# Patient Record
Sex: MALE | Race: WHITE | NOT HISPANIC OR LATINO
[De-identification: names, ages, dates, MRNs, and addresses within clinical notes are randomized per-mention and may not be internally consistent; named-entity substitution may affect disease eponyms.]

---

## 2017-01-11 ENCOUNTER — APPOINTMENT (OUTPATIENT)
Dept: OTOLARYNGOLOGY | Facility: CLINIC | Age: 52
End: 2017-01-11

## 2017-01-11 VITALS
HEART RATE: 75 BPM | HEIGHT: 71 IN | TEMPERATURE: 98.4 F | DIASTOLIC BLOOD PRESSURE: 66 MMHG | OXYGEN SATURATION: 99 % | BODY MASS INDEX: 25.9 KG/M2 | SYSTOLIC BLOOD PRESSURE: 96 MMHG | WEIGHT: 185 LBS

## 2017-01-19 ENCOUNTER — APPOINTMENT (OUTPATIENT)
Dept: OTOLARYNGOLOGY | Facility: CLINIC | Age: 52
End: 2017-01-19

## 2017-01-19 VITALS
OXYGEN SATURATION: 98 % | TEMPERATURE: 97.9 F | DIASTOLIC BLOOD PRESSURE: 70 MMHG | HEART RATE: 65 BPM | SYSTOLIC BLOOD PRESSURE: 105 MMHG

## 2017-02-15 ENCOUNTER — APPOINTMENT (OUTPATIENT)
Dept: OTOLARYNGOLOGY | Facility: CLINIC | Age: 52
End: 2017-02-15

## 2017-02-15 VITALS
HEART RATE: 79 BPM | DIASTOLIC BLOOD PRESSURE: 61 MMHG | OXYGEN SATURATION: 95 % | SYSTOLIC BLOOD PRESSURE: 93 MMHG | TEMPERATURE: 97.7 F

## 2017-03-15 ENCOUNTER — APPOINTMENT (OUTPATIENT)
Dept: INTERNAL MEDICINE | Facility: CLINIC | Age: 52
End: 2017-03-15

## 2017-05-17 ENCOUNTER — APPOINTMENT (OUTPATIENT)
Dept: OTOLARYNGOLOGY | Facility: CLINIC | Age: 52
End: 2017-05-17

## 2017-05-17 VITALS
OXYGEN SATURATION: 100 % | HEART RATE: 77 BPM | TEMPERATURE: 98.6 F | SYSTOLIC BLOOD PRESSURE: 90 MMHG | DIASTOLIC BLOOD PRESSURE: 60 MMHG

## 2017-05-17 DIAGNOSIS — H81.01 MENIERE'S DISEASE, RIGHT EAR: ICD-10-CM

## 2017-05-31 ENCOUNTER — APPOINTMENT (OUTPATIENT)
Dept: INTERNAL MEDICINE | Facility: CLINIC | Age: 52
End: 2017-05-31

## 2017-05-31 VITALS
OXYGEN SATURATION: 98 % | HEART RATE: 88 BPM | SYSTOLIC BLOOD PRESSURE: 90 MMHG | BODY MASS INDEX: 25.06 KG/M2 | WEIGHT: 179 LBS | TEMPERATURE: 98.3 F | HEIGHT: 71 IN | DIASTOLIC BLOOD PRESSURE: 70 MMHG

## 2017-05-31 DIAGNOSIS — F15.90 OTHER STIMULANT USE, UNSPECIFIED, UNCOMPLICATED: ICD-10-CM

## 2017-05-31 DIAGNOSIS — H81.393 OTHER PERIPHERAL VERTIGO, BILATERAL: ICD-10-CM

## 2017-05-31 DIAGNOSIS — Z00.00 ENCOUNTER FOR GENERAL ADULT MEDICAL EXAMINATION W/OUT ABNORMAL FINDINGS: ICD-10-CM

## 2017-05-31 DIAGNOSIS — R42 DIZZINESS AND GIDDINESS: ICD-10-CM

## 2017-05-31 DIAGNOSIS — Z78.9 OTHER SPECIFIED HEALTH STATUS: ICD-10-CM

## 2017-05-31 RX ORDER — CETIRIZINE HCL 10 MG
TABLET ORAL
Refills: 0 | Status: ACTIVE | COMMUNITY

## 2017-06-01 LAB
25(OH)D3 SERPL-MCNC: 31.2 NG/ML
ALBUMIN SERPL ELPH-MCNC: 4.6 G/DL
ALP BLD-CCNC: 86 U/L
ALT SERPL-CCNC: 23 U/L
ANION GAP SERPL CALC-SCNC: 18 MMOL/L
AST SERPL-CCNC: 27 U/L
BASOPHILS # BLD AUTO: 0.04 K/UL
BASOPHILS NFR BLD AUTO: 0.6 %
BILIRUB SERPL-MCNC: 1 MG/DL
BUN SERPL-MCNC: 15 MG/DL
CALCIUM SERPL-MCNC: 9.3 MG/DL
CHLORIDE SERPL-SCNC: 105 MMOL/L
CHOLEST SERPL-MCNC: 182 MG/DL
CHOLEST/HDLC SERPL: 3.3 RATIO
CO2 SERPL-SCNC: 20 MMOL/L
CREAT SERPL-MCNC: 1 MG/DL
EOSINOPHIL # BLD AUTO: 0.23 K/UL
EOSINOPHIL NFR BLD AUTO: 3.5 %
GLUCOSE SERPL-MCNC: 97 MG/DL
HBA1C MFR BLD HPLC: 5.9 %
HCT VFR BLD CALC: 46.2 %
HDLC SERPL-MCNC: 56 MG/DL
HGB BLD-MCNC: 15 G/DL
IMM GRANULOCYTES NFR BLD AUTO: 0.2 %
LDLC SERPL CALC-MCNC: 96 MG/DL
LYMPHOCYTES # BLD AUTO: 2.28 K/UL
LYMPHOCYTES NFR BLD AUTO: 34.5 %
MAN DIFF?: NORMAL
MCHC RBC-ENTMCNC: 30.4 PG
MCHC RBC-ENTMCNC: 32.5 GM/DL
MCV RBC AUTO: 93.7 FL
MONOCYTES # BLD AUTO: 0.36 K/UL
MONOCYTES NFR BLD AUTO: 5.5 %
NEUTROPHILS # BLD AUTO: 3.68 K/UL
NEUTROPHILS NFR BLD AUTO: 55.7 %
PLATELET # BLD AUTO: 234 K/UL
POTASSIUM SERPL-SCNC: 4.5 MMOL/L
PROT SERPL-MCNC: 7.1 G/DL
PSA FREE SERPL-MCNC: 0.3 NG/ML
RBC # BLD: 4.93 M/UL
RBC # FLD: 14.5 %
SODIUM SERPL-SCNC: 143 MMOL/L
TRIGL SERPL-MCNC: 148 MG/DL
TSH SERPL-ACNC: 2.13 UIU/ML
WBC # FLD AUTO: 6.6 K/UL

## 2017-08-10 ENCOUNTER — APPOINTMENT (OUTPATIENT)
Dept: GASTROENTEROLOGY | Facility: CLINIC | Age: 52
End: 2017-08-10
Payer: COMMERCIAL

## 2017-08-10 PROCEDURE — 45380 COLONOSCOPY AND BIOPSY: CPT | Mod: 33

## 2017-08-11 ENCOUNTER — RESULT REVIEW (OUTPATIENT)
Age: 52
End: 2017-08-11

## 2017-10-25 ENCOUNTER — APPOINTMENT (OUTPATIENT)
Dept: INTERNAL MEDICINE | Facility: CLINIC | Age: 52
End: 2017-10-25
Payer: COMMERCIAL

## 2017-10-25 VITALS
TEMPERATURE: 98.1 F | HEART RATE: 107 BPM | OXYGEN SATURATION: 98 % | SYSTOLIC BLOOD PRESSURE: 98 MMHG | DIASTOLIC BLOOD PRESSURE: 62 MMHG

## 2017-10-25 PROCEDURE — 99214 OFFICE O/P EST MOD 30 MIN: CPT

## 2017-11-02 ENCOUNTER — APPOINTMENT (OUTPATIENT)
Dept: INTERNAL MEDICINE | Facility: CLINIC | Age: 52
End: 2017-11-02
Payer: COMMERCIAL

## 2017-11-02 ENCOUNTER — APPOINTMENT (OUTPATIENT)
Dept: INTERNAL MEDICINE | Facility: CLINIC | Age: 52
End: 2017-11-02

## 2017-11-02 VITALS
HEART RATE: 89 BPM | DIASTOLIC BLOOD PRESSURE: 78 MMHG | HEIGHT: 71 IN | SYSTOLIC BLOOD PRESSURE: 88 MMHG | TEMPERATURE: 98.3 F | OXYGEN SATURATION: 98 %

## 2017-11-02 PROCEDURE — 99214 OFFICE O/P EST MOD 30 MIN: CPT

## 2017-11-13 ENCOUNTER — APPOINTMENT (OUTPATIENT)
Dept: OTOLARYNGOLOGY | Facility: CLINIC | Age: 52
End: 2017-11-13
Payer: COMMERCIAL

## 2017-11-13 VITALS
SYSTOLIC BLOOD PRESSURE: 95 MMHG | OXYGEN SATURATION: 95 % | DIASTOLIC BLOOD PRESSURE: 64 MMHG | HEART RATE: 83 BPM | TEMPERATURE: 97.8 F

## 2017-11-13 PROCEDURE — 69210 REMOVE IMPACTED EAR WAX UNI: CPT

## 2017-11-13 PROCEDURE — 99213 OFFICE O/P EST LOW 20 MIN: CPT | Mod: 25

## 2018-01-11 ENCOUNTER — APPOINTMENT (OUTPATIENT)
Dept: OTOLARYNGOLOGY | Facility: CLINIC | Age: 53
End: 2018-01-11
Payer: COMMERCIAL

## 2018-01-11 VITALS
DIASTOLIC BLOOD PRESSURE: 66 MMHG | SYSTOLIC BLOOD PRESSURE: 100 MMHG | BODY MASS INDEX: 24.08 KG/M2 | HEART RATE: 74 BPM | WEIGHT: 172 LBS | HEIGHT: 71 IN

## 2018-01-11 DIAGNOSIS — H81.03 MENIERE'S DISEASE, BILATERAL: ICD-10-CM

## 2018-01-11 DIAGNOSIS — H61.22 IMPACTED CERUMEN, LEFT EAR: ICD-10-CM

## 2018-01-11 DIAGNOSIS — H61.21 IMPACTED CERUMEN, RIGHT EAR: ICD-10-CM

## 2018-01-11 DIAGNOSIS — Z82.3 FAMILY HISTORY OF STROKE: ICD-10-CM

## 2018-01-11 DIAGNOSIS — J06.9 ACUTE UPPER RESPIRATORY INFECTION, UNSPECIFIED: ICD-10-CM

## 2018-01-11 PROCEDURE — 31231 NASAL ENDOSCOPY DX: CPT

## 2018-01-11 PROCEDURE — 99215 OFFICE O/P EST HI 40 MIN: CPT | Mod: 25

## 2018-01-19 RX ORDER — BENZONATATE 100 MG/1
100 CAPSULE ORAL
Qty: 60 | Refills: 0 | Status: DISCONTINUED | COMMUNITY
Start: 2017-10-25 | End: 2018-01-19

## 2018-02-02 ENCOUNTER — APPOINTMENT (OUTPATIENT)
Dept: OTOLARYNGOLOGY | Facility: CLINIC | Age: 53
End: 2018-02-02
Payer: COMMERCIAL

## 2018-02-02 PROBLEM — Z82.3 FAMILY HISTORY OF CEREBROVASCULAR ACCIDENT (CVA): Status: ACTIVE | Noted: 2018-02-02

## 2018-02-02 PROBLEM — H61.21 CERUMINOSIS, RIGHT: Status: RESOLVED | Noted: 2017-01-11 | Resolved: 2018-02-02

## 2018-02-02 PROBLEM — H81.03: Status: RESOLVED | Noted: 2017-11-13 | Resolved: 2018-02-02

## 2018-02-02 PROBLEM — H61.22 CERUMINOSIS, LEFT: Status: RESOLVED | Noted: 2017-11-13 | Resolved: 2018-02-02

## 2018-02-02 PROBLEM — J06.9 ACUTE UPPER RESPIRATORY INFECTION: Status: RESOLVED | Noted: 2017-10-25 | Resolved: 2018-02-02

## 2018-02-02 PROCEDURE — 99214 OFFICE O/P EST MOD 30 MIN: CPT | Mod: 25

## 2018-02-02 PROCEDURE — 31231 NASAL ENDOSCOPY DX: CPT

## 2018-02-14 RX ORDER — PREDNISONE 10 MG/1
10 TABLET ORAL
Qty: 18 | Refills: 0 | Status: COMPLETED | COMMUNITY
Start: 2018-01-11 | End: 2018-01-21

## 2018-03-02 ENCOUNTER — APPOINTMENT (OUTPATIENT)
Dept: OTOLARYNGOLOGY | Facility: CLINIC | Age: 53
End: 2018-03-02
Payer: COMMERCIAL

## 2018-03-02 ENCOUNTER — LABORATORY RESULT (OUTPATIENT)
Age: 53
End: 2018-03-02

## 2018-03-02 VITALS
HEART RATE: 77 BPM | SYSTOLIC BLOOD PRESSURE: 100 MMHG | WEIGHT: 172 LBS | HEIGHT: 71 IN | DIASTOLIC BLOOD PRESSURE: 70 MMHG | BODY MASS INDEX: 24.08 KG/M2

## 2018-03-02 DIAGNOSIS — J34.89 OTHER SPECIFIED DISORDERS OF NOSE AND NASAL SINUSES: ICD-10-CM

## 2018-03-02 PROCEDURE — 31231 NASAL ENDOSCOPY DX: CPT

## 2018-03-02 PROCEDURE — 99214 OFFICE O/P EST MOD 30 MIN: CPT | Mod: 25

## 2018-03-05 ENCOUNTER — TRANSCRIPTION ENCOUNTER (OUTPATIENT)
Age: 53
End: 2018-03-05

## 2018-04-01 ENCOUNTER — FORM ENCOUNTER (OUTPATIENT)
Age: 53
End: 2018-04-01

## 2018-04-02 ENCOUNTER — APPOINTMENT (OUTPATIENT)
Dept: CT IMAGING | Facility: HOSPITAL | Age: 53
End: 2018-04-02
Payer: COMMERCIAL

## 2018-04-02 ENCOUNTER — OUTPATIENT (OUTPATIENT)
Dept: OUTPATIENT SERVICES | Facility: HOSPITAL | Age: 53
LOS: 1 days | End: 2018-04-02
Payer: COMMERCIAL

## 2018-04-02 PROCEDURE — 70486 CT MAXILLOFACIAL W/O DYE: CPT

## 2018-04-02 PROCEDURE — 70486 CT MAXILLOFACIAL W/O DYE: CPT | Mod: 26

## 2018-04-06 ENCOUNTER — APPOINTMENT (OUTPATIENT)
Dept: OTOLARYNGOLOGY | Facility: CLINIC | Age: 53
End: 2018-04-06
Payer: COMMERCIAL

## 2018-04-06 VITALS
HEIGHT: 72 IN | DIASTOLIC BLOOD PRESSURE: 65 MMHG | HEART RATE: 86 BPM | BODY MASS INDEX: 23.16 KG/M2 | SYSTOLIC BLOOD PRESSURE: 91 MMHG | WEIGHT: 171 LBS

## 2018-04-06 DIAGNOSIS — Z86.69 PERSONAL HISTORY OF OTHER DISEASES OF THE NERVOUS SYSTEM AND SENSE ORGANS: ICD-10-CM

## 2018-04-06 DIAGNOSIS — Z01.00 ENCOUNTER FOR EXAMINATION OF EYES AND VISION W/OUT ABNORMAL FINDINGS: ICD-10-CM

## 2018-04-06 DIAGNOSIS — H69.83 OTHER SPECIFIED DISORDERS OF EUSTACHIAN TUBE, BILATERAL: ICD-10-CM

## 2018-04-06 PROCEDURE — 31231 NASAL ENDOSCOPY DX: CPT

## 2018-04-06 PROCEDURE — 99214 OFFICE O/P EST MOD 30 MIN: CPT | Mod: 25

## 2018-04-08 PROBLEM — H69.83 DYSFUNCTION OF BOTH EUSTACHIAN TUBES: Status: RESOLVED | Noted: 2018-02-02 | Resolved: 2018-04-08

## 2018-04-08 RX ORDER — DOXYCYCLINE HYCLATE 100 MG/1
100 CAPSULE ORAL DAILY
Qty: 21 | Refills: 0 | Status: COMPLETED | COMMUNITY
Start: 2018-03-07 | End: 2018-03-29

## 2018-05-14 ENCOUNTER — APPOINTMENT (OUTPATIENT)
Dept: OTOLARYNGOLOGY | Facility: CLINIC | Age: 53
End: 2018-05-14
Payer: COMMERCIAL

## 2018-05-14 VITALS — SYSTOLIC BLOOD PRESSURE: 90 MMHG | HEART RATE: 75 BPM | OXYGEN SATURATION: 97 % | DIASTOLIC BLOOD PRESSURE: 62 MMHG

## 2018-05-14 DIAGNOSIS — H81.01 MENIERE'S DISEASE, RIGHT EAR: ICD-10-CM

## 2018-05-14 PROCEDURE — 69210 REMOVE IMPACTED EAR WAX UNI: CPT

## 2018-05-14 PROCEDURE — 99213 OFFICE O/P EST LOW 20 MIN: CPT | Mod: 25

## 2018-06-12 ENCOUNTER — APPOINTMENT (OUTPATIENT)
Dept: GASTROENTEROLOGY | Facility: CLINIC | Age: 53
End: 2018-06-12
Payer: COMMERCIAL

## 2018-06-12 VITALS
HEIGHT: 72 IN | SYSTOLIC BLOOD PRESSURE: 90 MMHG | HEART RATE: 72 BPM | WEIGHT: 170 LBS | RESPIRATION RATE: 14 BRPM | BODY MASS INDEX: 23.03 KG/M2 | TEMPERATURE: 99 F | OXYGEN SATURATION: 98 % | DIASTOLIC BLOOD PRESSURE: 69 MMHG

## 2018-06-12 PROCEDURE — 99243 OFF/OP CNSLTJ NEW/EST LOW 30: CPT

## 2018-07-20 ENCOUNTER — APPOINTMENT (OUTPATIENT)
Dept: GASTROENTEROLOGY | Facility: CLINIC | Age: 53
End: 2018-07-20

## 2018-10-24 ENCOUNTER — APPOINTMENT (OUTPATIENT)
Dept: OTOLARYNGOLOGY | Facility: CLINIC | Age: 53
End: 2018-10-24
Payer: COMMERCIAL

## 2018-10-24 VITALS
BODY MASS INDEX: 24.38 KG/M2 | HEIGHT: 72 IN | SYSTOLIC BLOOD PRESSURE: 106 MMHG | HEART RATE: 84 BPM | DIASTOLIC BLOOD PRESSURE: 74 MMHG | WEIGHT: 180 LBS

## 2018-10-24 DIAGNOSIS — M26.629 ARTHRALGIA OF TEMPOROMANDIBULAR JOINT,: ICD-10-CM

## 2018-10-24 DIAGNOSIS — H61.23 IMPACTED CERUMEN, BILATERAL: ICD-10-CM

## 2018-10-24 DIAGNOSIS — H69.82 OTHER SPECIFIED DISORDERS OF EUSTACHIAN TUBE, LEFT EAR: ICD-10-CM

## 2018-10-24 PROCEDURE — 99214 OFFICE O/P EST MOD 30 MIN: CPT

## 2018-10-26 ENCOUNTER — RESULT REVIEW (OUTPATIENT)
Age: 53
End: 2018-10-26

## 2018-10-26 ENCOUNTER — APPOINTMENT (OUTPATIENT)
Dept: GASTROENTEROLOGY | Facility: CLINIC | Age: 53
End: 2018-10-26
Payer: COMMERCIAL

## 2018-10-26 PROCEDURE — 43239 EGD BIOPSY SINGLE/MULTIPLE: CPT

## 2018-12-20 PROBLEM — H69.82 DYSFUNCTION OF LEFT EUSTACHIAN TUBE: Status: ACTIVE | Noted: 2018-12-20

## 2018-12-20 PROBLEM — H61.23 CERUMINOSIS, BILATERAL: Status: RESOLVED | Noted: 2018-05-14 | Resolved: 2018-12-20

## 2018-12-20 PROBLEM — M26.629 TEMPOROMANDIBULAR SYNDROME: Status: ACTIVE | Noted: 2018-12-20

## 2018-12-24 ENCOUNTER — APPOINTMENT (OUTPATIENT)
Dept: GASTROENTEROLOGY | Facility: CLINIC | Age: 53
End: 2018-12-24
Payer: COMMERCIAL

## 2018-12-24 VITALS
WEIGHT: 180 LBS | DIASTOLIC BLOOD PRESSURE: 68 MMHG | HEART RATE: 85 BPM | BODY MASS INDEX: 24.41 KG/M2 | OXYGEN SATURATION: 98 % | RESPIRATION RATE: 14 BRPM | SYSTOLIC BLOOD PRESSURE: 101 MMHG

## 2018-12-24 DIAGNOSIS — K44.9 DIAPHRAGMATIC HERNIA W/OUT OBSTRUCTION OR GANGRENE: ICD-10-CM

## 2018-12-24 DIAGNOSIS — K21.0 GASTRO-ESOPHAGEAL REFLUX DISEASE WITH ESOPHAGITIS: ICD-10-CM

## 2018-12-24 PROCEDURE — 99214 OFFICE O/P EST MOD 30 MIN: CPT

## 2019-05-15 ENCOUNTER — MED ADMIN CHARGE (OUTPATIENT)
Age: 54
End: 2019-05-15

## 2019-06-07 ENCOUNTER — APPOINTMENT (OUTPATIENT)
Dept: GASTROENTEROLOGY | Facility: CLINIC | Age: 54
End: 2019-06-07
Payer: COMMERCIAL

## 2019-06-07 PROCEDURE — 43235 EGD DIAGNOSTIC BRUSH WASH: CPT

## 2019-08-08 ENCOUNTER — APPOINTMENT (OUTPATIENT)
Dept: OTOLARYNGOLOGY | Facility: CLINIC | Age: 54
End: 2019-08-08
Payer: COMMERCIAL

## 2019-08-08 VITALS
WEIGHT: 186 LBS | HEIGHT: 72 IN | BODY MASS INDEX: 25.19 KG/M2 | HEART RATE: 77 BPM | SYSTOLIC BLOOD PRESSURE: 106 MMHG | DIASTOLIC BLOOD PRESSURE: 67 MMHG

## 2019-08-08 DIAGNOSIS — K21.0 GASTRO-ESOPHAGEAL REFLUX DISEASE WITH ESOPHAGITIS: ICD-10-CM

## 2019-08-08 DIAGNOSIS — H61.22 IMPACTED CERUMEN, LEFT EAR: ICD-10-CM

## 2019-08-08 PROCEDURE — 31575 DIAGNOSTIC LARYNGOSCOPY: CPT

## 2019-08-08 PROCEDURE — 99214 OFFICE O/P EST MOD 30 MIN: CPT | Mod: 25

## 2019-08-08 RX ORDER — FLUTICASONE FUROATE AND VILANTEROL TRIFENATATE 200; 25 UG/1; UG/1
200-25 POWDER RESPIRATORY (INHALATION)
Qty: 60 | Refills: 0 | Status: COMPLETED | COMMUNITY
Start: 2018-02-23 | End: 2019-08-08

## 2019-08-26 ENCOUNTER — FORM ENCOUNTER (OUTPATIENT)
Age: 54
End: 2019-08-26

## 2019-08-27 ENCOUNTER — APPOINTMENT (OUTPATIENT)
Dept: CT IMAGING | Facility: HOSPITAL | Age: 54
End: 2019-08-27
Payer: COMMERCIAL

## 2019-08-27 ENCOUNTER — OUTPATIENT (OUTPATIENT)
Dept: OUTPATIENT SERVICES | Facility: HOSPITAL | Age: 54
LOS: 1 days | End: 2019-08-27
Payer: COMMERCIAL

## 2019-08-27 PROCEDURE — 70486 CT MAXILLOFACIAL W/O DYE: CPT

## 2019-08-27 PROCEDURE — 70486 CT MAXILLOFACIAL W/O DYE: CPT | Mod: 26

## 2019-08-28 ENCOUNTER — MOBILE ON CALL (OUTPATIENT)
Age: 54
End: 2019-08-28

## 2019-08-28 ENCOUNTER — MEDICATION RENEWAL (OUTPATIENT)
Age: 54
End: 2019-08-28

## 2019-09-11 ENCOUNTER — APPOINTMENT (OUTPATIENT)
Dept: OTOLARYNGOLOGY | Facility: CLINIC | Age: 54
End: 2019-09-11
Payer: COMMERCIAL

## 2019-09-11 VITALS
HEART RATE: 93 BPM | SYSTOLIC BLOOD PRESSURE: 118 MMHG | HEIGHT: 72 IN | BODY MASS INDEX: 25.73 KG/M2 | DIASTOLIC BLOOD PRESSURE: 48 MMHG | WEIGHT: 190 LBS

## 2019-09-11 DIAGNOSIS — J32.8 OTHER CHRONIC SINUSITIS: ICD-10-CM

## 2019-09-11 DIAGNOSIS — R68.89 OTHER GENERAL SYMPTOMS AND SIGNS: ICD-10-CM

## 2019-09-11 DIAGNOSIS — J34.2 DEVIATED NASAL SEPTUM: ICD-10-CM

## 2019-09-11 DIAGNOSIS — J34.3 HYPERTROPHY OF NASAL TURBINATES: ICD-10-CM

## 2019-09-11 PROCEDURE — 99214 OFFICE O/P EST MOD 30 MIN: CPT

## 2019-09-11 RX ORDER — METHYLPREDNISOLONE 4 MG/1
4 TABLET ORAL
Qty: 1 | Refills: 0 | Status: COMPLETED | COMMUNITY
Start: 2019-08-08 | End: 2019-09-11

## 2019-09-11 RX ORDER — DOXYCYCLINE HYCLATE 100 MG/1
100 CAPSULE ORAL DAILY
Qty: 14 | Refills: 0 | Status: COMPLETED | COMMUNITY
Start: 2019-08-08 | End: 2019-09-11

## 2019-09-30 PROBLEM — K21.0 GASTROESOPHAGEAL REFLUX DISEASE WITH ESOPHAGITIS: Status: ACTIVE | Noted: 2018-02-02

## 2019-09-30 NOTE — PROCEDURE
[FreeTextEntry6] : \par Indication:  chronic sinusitis\par -Verbal consent was obtained from patient prior to exam. \par - Madi-Synephrine and lidocaine 2% spray applied to nose bilaterally.\par Nasal endoscopy was performed with flexible   scope.\par Findings: \par -- Inferior turbinates edematous  bilateral.  Inferior meatus clear bilateral.\par -- Septum was   S-shaped with fracture off crest, more to left side\par -- No polyps either side nose\par -- Mucus clear bilateral\par -- Middle and superior turbinates normal bilateral\par -- Middle meatus clear right, congested on left. SER clear bilateral.\par -- Nasopharynx without mass or exudate\par -- Adenoids were small\par -- Eustachian orifices were clear bilateral\par \par The patient tolerated the procedure well.\par \par  [de-identified] : \par Indication:  reflux\par -Verbal consent was obtained from patient prior to procedure.\par Flexible laryngoscopy was performed via left nostril and revealed the following:\par   -- Nasopharynx had no mass or exudate.\par   -- Base of tongue was symmetric and not enlarged.\par   -- Cobblestoning posterior pharyngeal wall is minimal\par   -- Epiglottis, both aryepiglottic folds and both false vocal folds were normal\par   -- Arytenoids both with mild edema and erythema \par   -- True vocal folds were fully mobile and without lesions. \par   -- Post cricoid area was clear.\par   -- Interarytenoid edema was   present.\par \par The patient tolerated the procedure well.\par \par

## 2019-09-30 NOTE — ASSESSMENT
[FreeTextEntry1] : Mr. Velasquez has chronic sinusitis and postnasal drip causing throat clearing.\par Inferior turbinate hypertrophy causing nasal congestion.\par Reflux effects minimal on reflux precautions and pantoprazole.\par \par PLAN:\par Doxycycline x 2 week (just finished last week 7 days for UTI)\par CT sinus noncontrast after antibiotics\par \par Return after CT scan.

## 2019-09-30 NOTE — HISTORY OF PRESENT ILLNESS
[de-identified] : Still with postnasal drip and throat clearing, worse in AM and then later in evening.\par Left nasal congestion.\par No heartburn.\par He just finished a 7-day course of doxycycline last week for UTI\par

## 2019-09-30 NOTE — PHYSICAL EXAM
[Nasal Endoscopy Performed] : nasal endoscopy was performed, see procedure section for findings [Midline] : trachea located in midline position [Laryngoscopy Performed] : laryngoscopy was performed, see procedure section for findings [Normal] : no neck adenopathy [de-identified] : 1+ bilateral

## 2019-11-06 ENCOUNTER — RX RENEWAL (OUTPATIENT)
Age: 54
End: 2019-11-06

## 2020-02-11 RX ORDER — ESOMEPRAZOLE MAGNESIUM 40 MG/1
40 CAPSULE, DELAYED RELEASE ORAL DAILY
Qty: 60 | Refills: 0 | Status: DISCONTINUED | COMMUNITY
Start: 2018-10-26 | End: 2020-02-11

## 2020-02-11 RX ORDER — PANTOPRAZOLE 20 MG/1
20 TABLET, DELAYED RELEASE ORAL
Qty: 30 | Refills: 3 | Status: ACTIVE | COMMUNITY
Start: 2018-12-24 | End: 1900-01-01

## 2020-02-21 PROBLEM — J34.2 DEVIATED NASAL SEPTUM: Status: ACTIVE | Noted: 2018-02-02

## 2020-02-21 PROBLEM — J34.3 HYPERTROPHY OF BOTH INFERIOR NASAL TURBINATES: Status: ACTIVE | Noted: 2018-03-02

## 2020-02-21 PROBLEM — R68.89 CHRONIC THROAT CLEARING: Status: ACTIVE | Noted: 2020-02-21

## 2020-02-21 PROBLEM — J32.8 OTHER CHRONIC SINUSITIS: Status: ACTIVE | Noted: 2018-01-11

## 2020-02-21 NOTE — PHYSICAL EXAM
[] : septum deviated to the left [Midline] : trachea located in midline position [Normal] : no neck adenopathy [de-identified] : 1+ bilateral [de-identified] : Mild inferior turbinate hypertrophy, R>L

## 2020-02-21 NOTE — ASSESSMENT
[FreeTextEntry1] : Mr. Velasquez has chronic sinusitis, postnasal drip and reflux contributing to chronic throat clearing, which improved somewhat after 3 weeks antibiotics.\par Inferior turbinate hypertrophy and left deviated nasal septum causing nasal congestion.\par CT sinus exam showed narrowing of sinus drainage pathways but no sinusitis.  The polyps/retention cysts in the maxillary sinus are smaller.\par \par PLAN:\par Reviewed reflux precautions again, which he is following closely.\par Continue pantoprazole\par We discussed bilateral endosinus surgery (ethmoid, maxillary), septoplasty and bilateral  inferior turbinate reduction. Risks, benefits and alternatives were reviewed.  Risks include, but are not limited to, bleeding, infection, septal hematoma or abscess, septal perforation, intranasal scarring, difficulty breathing, cerebrospinal fluid leak, vision change. \par He is aware that surgery is not a guarantee of sx relief.\par His questions were answered and he will consider. \par \par Return as needed.\par

## 2020-02-21 NOTE — HISTORY OF PRESENT ILLNESS
[de-identified] : Mr. Velasquez was seen in f/up.\par Has less postnasal drip and throat clearing (still worse in AM and then later in evening) after total 3 weeks doxycycline.\par Able to breathe through nose on right, not so much on left.\par No heartburn.\par Had CT sinus after the antibiotics finished.\par \par \par CT SINUS noncontrast (8/27/2019) at [facility]:\par - Prior Studies: CT sinuses 4/2/2018 \par * Prior Surgery: None is evident. \par * Sinuses: There is improved ventilation of the maxillary sinuses with much smaller size of dominant polyp or retention cyst in the right antrum. On the left, dominant polyp or retention cyst is not significantly changed but others have regressed. No fluid level or evidence of odontogenic sinusitis. Mild to moderate mucosal thickening throughout the ethmoid air cells is unchanged. \par * Sinocranial and Sino-orbital Junctions: Grossly intact. Previously described focus at the left ethmoid roof may reflect some dural ostosis as the fovea appears separate and intact (coronal image 32). There is low lie of the right ethmoid roof with gradual slope of the lateral lamella. \par * Ostiomeatal Units: Narrowed bilaterally with close apposition to the uncinate processes and ethmoid air cells.\par * Frontal Sinus Outflow Tracts: Absent on the right. Narrowed on the left, deviated posteriorly by agger nasi and type I frontal cells. \par * Sphenoethmoidal Recesses: Patent, although there is mucosal thickening and likely apposition of the olfactory clefts, unchanged. \par * Nasal Cavity/Nasopharynx: There is severe nasal septal deviation to the left with bony angulation distorting the inferior turbinate and mildly deviating the middle turbinate. No focal soft tissue mass or bony destructive change. Noncontrast appearance of the nasopharynx is unremarkable. \par * Orbits: No abnormal soft tissue or mass effect. \par * Brain: Unremarkable \par * Mastoids: Well aerated bilaterally \par IMPRESSION: \par Compared to April 2018, maxillary sinus polyps/retention cysts are smaller. No active sinusitis.\par Bilateral OMU narrowing secondary to ethmoid anatomy is unchanged. \par Severe nasal septal deviation to the left.\par (Images were reviewed.) \par

## 2024-03-12 ENCOUNTER — APPOINTMENT (OUTPATIENT)
Dept: UROLOGY | Facility: CLINIC | Age: 59
End: 2024-03-12
Payer: COMMERCIAL

## 2024-03-12 VITALS
BODY MASS INDEX: 25.9 KG/M2 | HEIGHT: 71 IN | HEART RATE: 101 BPM | SYSTOLIC BLOOD PRESSURE: 103 MMHG | WEIGHT: 185 LBS | OXYGEN SATURATION: 96 % | DIASTOLIC BLOOD PRESSURE: 72 MMHG | TEMPERATURE: 97.7 F

## 2024-03-12 PROCEDURE — 99204 OFFICE O/P NEW MOD 45 MIN: CPT

## 2024-03-12 NOTE — HISTORY OF PRESENT ILLNESS
[FreeTextEntry1] : Language: English Date of First visit: 3/12/2024 Accompanied by: *** Contact info: *** Referring Provider/PCP: Does not have at this time       CC/ Problem List:   =============================================================================== FIRST VISIT: The patient is a 58 year male who first presents 2024.  Patient has been seeing Dr. Terry Craven Prior paper chart WAS available for this visit. Any Prior paper chart is scanned into the [employer] system. Please see admin  He was first seen by Dr. Craven in 2019 for penile discharge after having intercourse w/ his wife who had yeast. He was given fluconazole. He had some persistent dyuria and tested negative for STDs. Dr. Craven gave him Doxy.  In 2019 his PSA was 3.2 (high for age) and then Dr. Craven gave him 5 days of Cipro.  In 2020 he reported some frequency Q3H, nocturia x2, hesitancy and weak stream.  In 2022 he also reports some ED. He is not on any meds for his LUTS or ED.   He sleeps poorly and wakes several times per night but does not wake to void. He is not significantly bothered by his voiding. He denies hematuria, dysuria. HE does have some frequency (mild) but he is not bothered enough to consider meds at this time. He is taking [employer] lessons. 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 He is also not currently finding that his mild ED is impacting his life.  He does occasionally have itching around his groin area. There are no obvious rashes. He has try weak topical steroid with some improvement but found antifungals only sometimes work. His anus also itches but is not painful.   ------------------------------------------------------------------------------------------- INTERVAL VISITS:     ===============================================================================   PMH: SHAYY PSH: Appy, Hernia, Varicolectomy POBH: (if applicable) FH:   ALL: NKDA MEDS: Vit C, D, Mg, B12, Fishoil, [place] SOC:     ROS: Review of Systems is as per HPI unless otherwise denoted below     =============================================================================== DATA:   LABS:-------------------------------------------------------------------------------------------------------------------  2019: GC/CT negative, + ureaplasma,   2019: PSA 3.24 10/2/2019: PSA 1.5 9/3/2020: PSA 0.6, UA negative with 0-2 RBC, Trace ketones 10/7/2021: PSA 0.67 2023: PSA 0.67  2/15/2024: PSA 0.8     RADS:-------------------------------------------------------------------------------------------------------------------       PATHOLOGY/CYTOLOGY:-------------------------------------------------------------------------------------------       VOIDING STUDIES: ----------------------------------------------------------------------------------------------------  3/12/2024: PVR 5cc     STONE STUDIES: (Analysis/LLSA)----------------------------------------------------------------------------------       PROCEDURES: -----------------------------------------------------------------------------------------------         ===============================================================================   PHYSICAL EXAM:  GEN: AAOx3, NAD; Habitus: normal  BARRIERS to CARE: none  PSYCH: Appropriate Behavior, Affect Congruent  HEENT: AT/NC Trachea midline. EOMI.  Lungs: No labored breathing.  NEURO: + Movement, all 4 extremities grossly intact without deficits. No tremors.  SKIN: Warm dry. No visible rashes or ulcers  GAIT: Gait normal, Stability good   FOCUSED: -------------------------------------------------------------------------------------------------  In compliance with [facility] policy the patient was offered a chaperone during this exam The patient DID accept a chaperone The person present for this entire exam/procedure as chaperone was: Herber JOSEPH  Prostate: (date 3/12/2024) 30 grams. Smooth. No nodules. Symmetric. No tenderness, No Fluctuance. MAURO: No hemorrhoids. Normal tone. No obvious fissues or fistulae =======================================================================================    ASSESSMENT and PLAN       The patient is a 58 year male with a history of the followin. Mild LUTS He has a low PVR and sleeps poorly. He is not bothered enough by his LUTS to want treatment at this time.  2. ED His ED is not currently bothering him enough for him to want treatment.  3. Prostate cancer screening His PSA is still normal for his age.  4. Groin itching without rash Dr. Craven recommended antifungal / jock itch cream/powder and I recommended trying Head and shoulders when showering in the area. If his symptoms do not anita, he will let us know.    -----------------------------------------------------------------------------------------------------   LABS/TESTS Ordered: None   Meds Ordered:   Follow up: one year for sx check and PSA review   -----------------------------------------------------------------------------------------------------    The total amount of time I have personally spent preparing for this visit, reviewing the patient's test results, obtaining external history, ordering tests/medications, documenting clinical information, communicating with and counseling the patient/family and/or caregiver(s), and spent face to face with the patient explaining the above was 45 minutes.  Thank you for allowing me to participate in your patient's care. Please feel free to contact me with any questions.   Mariel Ramos MD [facility] Department of Urology   [address] [place] [id number] P: [phone]; F: [phone]

## 2025-03-10 ENCOUNTER — NON-APPOINTMENT (OUTPATIENT)
Age: 60
End: 2025-03-10

## 2025-03-11 ENCOUNTER — APPOINTMENT (OUTPATIENT)
Dept: UROLOGY | Facility: CLINIC | Age: 60
End: 2025-03-11
Payer: COMMERCIAL

## 2025-03-11 ENCOUNTER — NON-APPOINTMENT (OUTPATIENT)
Age: 60
End: 2025-03-11

## 2025-03-11 VITALS
HEIGHT: 71 IN | TEMPERATURE: 98.1 F | HEART RATE: 86 BPM | SYSTOLIC BLOOD PRESSURE: 85 MMHG | OXYGEN SATURATION: 98 % | BODY MASS INDEX: 25.9 KG/M2 | WEIGHT: 185 LBS | DIASTOLIC BLOOD PRESSURE: 57 MMHG

## 2025-03-11 DIAGNOSIS — R35.0 FREQUENCY OF MICTURITION: ICD-10-CM

## 2025-03-11 PROCEDURE — 99214 OFFICE O/P EST MOD 30 MIN: CPT

## 2025-03-12 LAB — PSA SERPL-MCNC: 0.85 NG/ML
